# Patient Record
(demographics unavailable — no encounter records)

---

## 2024-10-07 NOTE — HISTORY OF PRESENT ILLNESS
[FreeTextEntry1] : F/U Apt. for Renewal of Meds and TDAP  [de-identified] : F/U Apt. for Renewal of Meds and TDAP

## 2024-10-07 NOTE — REVIEW OF SYSTEMS
[Patient Intake Form Reviewed] : Patient intake form was reviewed [Fatigue] : fatigue [Dyspnea on Exertion] : dyspnea on exertion [Abdominal Pain] : abdominal pain [Nausea] : nausea [Constipation] : constipation [Vomiting] : vomiting [Heartburn] : heartburn [Melena] : melsamir [Joint Pain] : joint pain [Anxiety] : anxiety [Negative] : Heme/Lymph [Fever] : no fever [Chills] : no chills [Chest Pain] : no chest pain [Palpitations] : no palpitations [Claudication] : no  leg claudication [Lower Ext Edema] : no lower extremity edema [Orthopena] : no orthopnea [Paroxysmal Nocturnal Dyspnea] : no paroxysmal nocturnal dyspnea [Shortness Of Breath] : no shortness of breath [Wheezing] : no wheezing [Cough] : no cough [Diarrhea] : no diarrhea [Dysuria] : no dysuria [Incontinence] : no incontinence [Hesitancy] : no hesitancy [Nocturia] : no nocturia [Hematuria] : no hematuria [Frequency] : no frequency [Impotence] : no impotency [Poor Libido] : libido not poor [Suicidal] : not suicidal [Insomnia] : no insomnia [Depression] : no depression [FreeTextEntry2] : Overweight [FreeTextEntry5] : CHF  [FreeTextEntry6] : obesity and CHF  [FreeTextEntry7] : GERD, stomach pain with eating  [FreeTextEntry8] : BPH, Urine very dark, sometimes light brown.  [FreeTextEntry9] : chronic knee pain  [FreeTextEntry1] : Low Vit. D, DM Borderline

## 2024-10-07 NOTE — HEALTH RISK ASSESSMENT
[1 or 2 (0 pts)] : 1 or 2 (0 points) [Never (0 pts)] : Never (0 points) [No] : In the past 12 months have you used drugs other than those required for medical reasons? No [No falls in past year] : Patient reported no falls in the past year [0] : 2) Feeling down, depressed, or hopeless: Not at all (0) [PHQ-2 Negative - No further assessment needed] : PHQ-2 Negative - No further assessment needed [Patient/Caregiver not ready to engage] : , patient/caregiver not ready to engage [I will adhere to the patient's wishes.] : I will adhere to the patient's wishes. [Time Spent: ___ minutes] : Time Spent: [unfilled] minutes [Never] : Never [Audit-CScore] : 0 [de-identified] : Average [de-identified] : Average [GVT3Lmpnb] : 0 [AdvancecareDate] : 03/24

## 2024-10-07 NOTE — PHYSICAL EXAM
[No Acute Distress] : no acute distress [Well Nourished] : well nourished [Well Developed] : well developed [Well-Appearing] : well-appearing [Normal Sclera/Conjunctiva] : normal sclera/conjunctiva [PERRL] : pupils equal round and reactive to light [EOMI] : extraocular movements intact [Normal Outer Ear/Nose] : the outer ears and nose were normal in appearance [Normal Oropharynx] : the oropharynx was normal [No JVD] : no jugular venous distention [No Lymphadenopathy] : no lymphadenopathy [Supple] : supple [Thyroid Normal, No Nodules] : the thyroid was normal and there were no nodules present [No Respiratory Distress] : no respiratory distress  [No Accessory Muscle Use] : no accessory muscle use [Clear to Auscultation] : lungs were clear to auscultation bilaterally [Normal Rate] : normal rate  [Regular Rhythm] : with a regular rhythm [Normal S1, S2] : normal S1 and S2 [No Murmur] : no murmur heard [No Carotid Bruits] : no carotid bruits [No Abdominal Bruit] : a ~M bruit was not heard ~T in the abdomen [No Varicosities] : no varicosities [Pedal Pulses Present] : the pedal pulses are present [No Edema] : there was no peripheral edema [No Palpable Aorta] : no palpable aorta [No Extremity Clubbing/Cyanosis] : no extremity clubbing/cyanosis [Soft] : abdomen soft [Non Tender] : non-tender [Non-distended] : non-distended [No Masses] : no abdominal mass palpated [No HSM] : no HSM [Normal Bowel Sounds] : normal bowel sounds [Normal] : soft, non-tender, non-distended, no masses palpated, no HSM and normal bowel sounds [Normal Posterior Cervical Nodes] : no posterior cervical lymphadenopathy [Normal Anterior Cervical Nodes] : no anterior cervical lymphadenopathy [No CVA Tenderness] : no CVA  tenderness [No Spinal Tenderness] : no spinal tenderness [No Joint Swelling] : no joint swelling [Grossly Normal Strength/Tone] : grossly normal strength/tone [No Rash] : no rash [Coordination Grossly Intact] : coordination grossly intact [No Focal Deficits] : no focal deficits [Normal Gait] : normal gait [Deep Tendon Reflexes (DTR)] : deep tendon reflexes were 2+ and symmetric [Normal Affect] : the affect was normal [Normal Mood] : the mood was normal [Normal Insight/Judgement] : insight and judgment were intact [de-identified] : pacemaker

## 2024-10-07 NOTE — COUNSELING
[Fall prevention counseling provided] : Fall prevention counseling provided [Adequate lighting] : Adequate lighting [No throw rugs] : No throw rugs [Use proper foot wear] : Use proper foot wear [Behavioral health counseling provided] : Behavioral health counseling provided [Sleep ___ hours/day] : Sleep [unfilled] hours/day [Engage in a relaxing activity] : Engage in a relaxing activity [Plan in advance] : Plan in advance [AUDIT-C Screening administered and reviewed] : AUDIT-C Screening administered and reviewed [Potential consequences of obesity discussed] : Potential consequences of obesity discussed [Benefits of weight loss discussed] : Benefits of weight loss discussed [Encouraged to increase physical activity] : Encouraged to increase physical activity [Weigh Self Weekly] : weigh self weekly [Decrease Portions] : decrease portions [None] : None [Good understanding] : Patient has a good understanding of lifestyle changes and steps needed to achieve self management goal [FreeTextEntry2] : Quit

## 2024-10-07 NOTE — ASSESSMENT
[FreeTextEntry1] : HTN/Overweight/Fatigue/CAD -Diet exercise, controlled with meds.  PSA Screening - PSA. 2.19 Anxiety - managed with medication ED - Controlled with meds  COVID  19 Screening - + COVID 19 Abs.  Headaches - Neurology Mgt R Hand Pain - Hand Surgical Mgt. Knee Pain - Ortho Mgt. Vit D deficiency - Vit D  CKD/decreased GFR/Microalbuminuria - CONSIDER D/C SPIRINOLACTONE, ADD KERINDIA  TDAP given R arm.  F/U in the spring for lab testing.

## 2024-10-16 NOTE — ASSESSMENT
[FreeTextEntry1] : 59 y/o M with hx of CAD, CHF, hx of Afib s/p ablation x 3, Arthritis, Anxiety, PTSD, DM, GERD, Pacemaker, kidney stones, sleep apnea, who presents for evaluation of sciatica pain.  History suggestive of possible R L5 radiculopathy, no recent imaging.  Patient has tried multiple conservate approaches without relief.  Would reevaluate lumbar spine to see if an epidural injection might benefit.   Also has daily frontal/bitemporal headaches--will confirm no intracranial pathology  PLAN: - MRI Brain wo  - MRA head to r/o aneurysm given daily headaches - MRI L spine - Patient will need device information regarding pacemaker in order to complete MRI. He has had an MRI in the past. - Referral to Pain management - Increase Gabapentin to 300mg q 6h

## 2024-10-16 NOTE — HISTORY OF PRESENT ILLNESS
[FreeTextEntry1] :  [facility] NEUROLOGY AT [place]  CC: Sciatica HPI: 57 y/o M with hx of CAD, CHF, hx of Afib s/p ablation x 3,  Arthritis, Anxiety, PTSD, DM, GERD, Pacemaker, kidney stones, sleep apnea, who presents for evaluation of sciatica pain.    For past one year and half, has been having right buttock pain that radiates to the back of the leg down to the foot.  Currently on Gabapentin 300mg TID which gives partial relief but then stops.  Was previously 318lbs, now at 270lb but cannot walk due to knee issues. Says he is due to having left knee pain and will require a replacement. States he previously had an MRI lumbar spine and some disc disease in the low back.  Has tried trigger points in the buttocks and mid thigh, but no relief.  Has tried PT and stretching, seen a chiropractor at work.  Patient denies any weakness but noticing tingling in the right toes.    Also has complaints of daily headaches, located in bitemporal/frontal region. Has chronic neck tension since playing football when younger. Headaches are tightness 7/10, no other associated symptoms.

## 2024-10-16 NOTE — PHYSICAL EXAM
[FreeTextEntry1] :   General: Cooperative, NAD HEENT: NC/AT, no carotid bruits Lungs: CTAB Chest: RRR, no murmurs Extremities: nontender, no erythema Neurological Examination: MS: AOx3. Appropriately interactive, normal affect. Speech fluent w/o paraphasic errors CN: PERLL, EOMI, V1-3 sensation intact, face symmetric, hearing intact, palate elevates symmetrically, tongue midline, SCM equal bilaterally Motor: normal bulk and tone, no tremor, rigidity or bradykinesia.  5/5 all over Sens: Intact to light touch. Reflexes: 2/4 all over, downgoing toes b/l Coord:  No dysmetria, MARLI intact Gait: antalgic

## 2024-10-24 NOTE — HISTORY OF PRESENT ILLNESS
[FreeTextEntry1] : F/U Apt. for Renewal of Meds ENT referral  [de-identified] : F/U Apt. for Renewal of Meds and ENT referral

## 2024-10-24 NOTE — HEALTH RISK ASSESSMENT
[Audit-CScore] : 0 [de-identified] : Average [de-identified] : Average [NNQ3Gjjkz] : 0 [AdvancecareDate] : 03/24

## 2024-10-24 NOTE — REVIEW OF SYSTEMS
[Fever] : no fever [Chills] : no chills [Chest Pain] : no chest pain [Palpitations] : no palpitations [Claudication] : no  leg claudication [Lower Ext Edema] : no lower extremity edema [Orthopena] : no orthopnea [Paroxysmal Nocturnal Dyspnea] : no paroxysmal nocturnal dyspnea [Shortness Of Breath] : no shortness of breath [Wheezing] : no wheezing [Cough] : no cough [Diarrhea] : no diarrhea [Dysuria] : no dysuria [Incontinence] : no incontinence [Hesitancy] : no hesitancy [Nocturia] : no nocturia [Hematuria] : no hematuria [Frequency] : no frequency [Impotence] : no impotency [Poor Libido] : libido not poor [Suicidal] : not suicidal [Insomnia] : no insomnia [Depression] : no depression [FreeTextEntry2] : Overweight [FreeTextEntry5] : CHF  [FreeTextEntry6] : obesity and CHF  [FreeTextEntry7] : GERD, stomach pain with eating  [FreeTextEntry8] : BPH, Urine very dark, sometimes light brown.  [FreeTextEntry9] : chronic knee pain  [FreeTextEntry1] : Low Vit. D, DM Borderline

## 2024-10-30 NOTE — ASSESSMENT
[FreeTextEntry1] : Hearing loss, likely some noise related loss.  Check Audiogram.  Has and is using HA's from  mother.  Will return to audiologist for possible reprogramming. Flonase for rhinitis. Seek attention for otalgia, otorrhea, bleeding, headache, hearing loss, dizziness or vertigo. Followup 2 months

## 2024-10-30 NOTE — HISTORY OF PRESENT ILLNESS
[de-identified] : 58 year old male here for tinnitus for 1 year and decreased hearing for 3 weeks with intermittent dizziness. Patient denies otalgia, otorrhea, ear infections, vertigo. Pt has been wearing  mothers hearing aids to hear. Most recent hearing test 2 years ago showed mild hearing loss.   Also notes nonpurulent runnyt nose moreso recently.  PMH occupational and environmental noise exposure

## 2024-10-30 NOTE — PHYSICAL EXAM
[de-identified] : mild nasal congestion of mucosa [Midline] : trachea located in midline position [Normal] : salivary glands are normal

## 2024-10-30 NOTE — CONSULT LETTER
[Dear  ___] : Dear  [unfilled], [Courtesy Letter:] : I had the pleasure of seeing your patient, [unfilled], in my office today. [Please see my note below.] : Please see my note below. [Sincerely,] : Sincerely, [FreeTextEntry2] : Dr. Amado White [FreeTextEntry3] : Irineo Peters MD Otolaryngology at [address], [address] [place] [address] Phone: [phone] Fax: [phone]

## 2024-11-20 NOTE — PHYSICAL EXAM
[No Acute Distress] : no acute distress [Well Nourished] : well nourished [Well Developed] : well developed [Well-Appearing] : well-appearing [Normal Sclera/Conjunctiva] : normal sclera/conjunctiva [PERRL] : pupils equal round and reactive to light [EOMI] : extraocular movements intact [Normal Outer Ear/Nose] : the outer ears and nose were normal in appearance [Normal Oropharynx] : the oropharynx was normal [No JVD] : no jugular venous distention [No Lymphadenopathy] : no lymphadenopathy [Supple] : supple [Thyroid Normal, No Nodules] : the thyroid was normal and there were no nodules present [No Respiratory Distress] : no respiratory distress  [No Accessory Muscle Use] : no accessory muscle use [Clear to Auscultation] : lungs were clear to auscultation bilaterally [Normal Rate] : normal rate  [Regular Rhythm] : with a regular rhythm [Normal S1, S2] : normal S1 and S2 [No Murmur] : no murmur heard [No Carotid Bruits] : no carotid bruits [No Abdominal Bruit] : a ~M bruit was not heard ~T in the abdomen [No Varicosities] : no varicosities [Pedal Pulses Present] : the pedal pulses are present [No Edema] : there was no peripheral edema [No Palpable Aorta] : no palpable aorta [No Extremity Clubbing/Cyanosis] : no extremity clubbing/cyanosis [Soft] : abdomen soft [Non Tender] : non-tender [Non-distended] : non-distended [No Masses] : no abdominal mass palpated [No HSM] : no HSM [Normal Bowel Sounds] : normal bowel sounds [Normal] : soft, non-tender, non-distended, no masses palpated, no HSM and normal bowel sounds [Normal Posterior Cervical Nodes] : no posterior cervical lymphadenopathy [Normal Anterior Cervical Nodes] : no anterior cervical lymphadenopathy [No CVA Tenderness] : no CVA  tenderness [No Spinal Tenderness] : no spinal tenderness [No Joint Swelling] : no joint swelling [Grossly Normal Strength/Tone] : grossly normal strength/tone [No Rash] : no rash [Coordination Grossly Intact] : coordination grossly intact [No Focal Deficits] : no focal deficits [Normal Gait] : normal gait [Deep Tendon Reflexes (DTR)] : deep tendon reflexes were 2+ and symmetric [Normal Affect] : the affect was normal [Normal Mood] : the mood was normal [Normal Insight/Judgement] : insight and judgment were intact [de-identified] : pacemaker

## 2024-11-20 NOTE — HEALTH RISK ASSESSMENT
[1 or 2 (0 pts)] : 1 or 2 (0 points) [Never (0 pts)] : Never (0 points) [No] : In the past 12 months have you used drugs other than those required for medical reasons? No [No falls in past year] : Patient reported no falls in the past year [0] : 2) Feeling down, depressed, or hopeless: Not at all (0) [PHQ-2 Negative - No further assessment needed] : PHQ-2 Negative - No further assessment needed [Audit-CScore] : 0 [de-identified] : Average [de-identified] : Average [[id number]] : 0 [Patient/Caregiver not ready to engage] : , patient/caregiver not ready to engage [I will adhere to the patient's wishes.] : I will adhere to the patient's wishes. [Time Spent: ___ minutes] : Time Spent: [unfilled] minutes [AdvancecareDate] : 03/24 [Never] : Never

## 2024-11-20 NOTE — ASSESSMENT
[FreeTextEntry1] : HTN/Overweight/Fatigue/CAD -Diet exercise, controlled with meds.  PSA Screening - PSA. 2.19 Anxiety - managed with medication ED - Controlled with meds  COVID  19 Screening - + COVID 19 Abs.  Headaches - Neurology Mgt R Hand Pain - Hand Surgical Mgt. Knee Pain - Ortho Mgt. Vit D deficiency - Vit D  CKD/decreased GFR/Microalbuminuria - CONSIDER D/C SPIRINOLACTONE, ADD KERINDIA   F/U in the spring for lab testing.

## 2024-11-20 NOTE — COUNSELING
[Fall prevention counseling provided] : Fall prevention counseling provided [Adequate lighting] : Adequate lighting [No throw rugs] : No throw rugs [Use proper foot wear] : Use proper foot wear [Behavioral health counseling provided] : Behavioral health counseling provided [Sleep ___ hours/day] : Sleep [unfilled] hours/day [Engage in a relaxing activity] : Engage in a relaxing activity [Plan in advance] : Plan in advance [FreeTextEntry2] : Quit [AUDIT-C Screening administered and reviewed] : AUDIT-C Screening administered and reviewed [Potential consequences of obesity discussed] : Potential consequences of obesity discussed [Benefits of weight loss discussed] : Benefits of weight loss discussed [Encouraged to increase physical activity] : Encouraged to increase physical activity [Weigh Self Weekly] : weigh self weekly [Decrease Portions] : decrease portions [None] : None [Good understanding] : Patient has a good understanding of lifestyle changes and steps needed to achieve self management goal

## 2024-11-20 NOTE — HEALTH RISK ASSESSMENT
[1 or 2 (0 pts)] : 1 or 2 (0 points) [Never (0 pts)] : Never (0 points) [No] : In the past 12 months have you used drugs other than those required for medical reasons? No [No falls in past year] : Patient reported no falls in the past year [0] : 2) Feeling down, depressed, or hopeless: Not at all (0) [PHQ-2 Negative - No further assessment needed] : PHQ-2 Negative - No further assessment needed [Audit-CScore] : 0 [de-identified] : Average [de-identified] : Average [[id number]] : 0 [Patient/Caregiver not ready to engage] : , patient/caregiver not ready to engage [I will adhere to the patient's wishes.] : I will adhere to the patient's wishes. [Time Spent: ___ minutes] : Time Spent: [unfilled] minutes [AdvancecareDate] : 03/24 [Never] : Never

## 2024-11-20 NOTE — PHYSICAL EXAM
[No Acute Distress] : no acute distress [Well Nourished] : well nourished [Well Developed] : well developed [Well-Appearing] : well-appearing [Normal Sclera/Conjunctiva] : normal sclera/conjunctiva [PERRL] : pupils equal round and reactive to light [EOMI] : extraocular movements intact [Normal Outer Ear/Nose] : the outer ears and nose were normal in appearance [Normal Oropharynx] : the oropharynx was normal [No JVD] : no jugular venous distention [No Lymphadenopathy] : no lymphadenopathy [Supple] : supple [Thyroid Normal, No Nodules] : the thyroid was normal and there were no nodules present [No Respiratory Distress] : no respiratory distress  [No Accessory Muscle Use] : no accessory muscle use [Clear to Auscultation] : lungs were clear to auscultation bilaterally [Normal Rate] : normal rate  [Regular Rhythm] : with a regular rhythm [Normal S1, S2] : normal S1 and S2 [No Murmur] : no murmur heard [No Carotid Bruits] : no carotid bruits [No Abdominal Bruit] : a ~M bruit was not heard ~T in the abdomen [No Varicosities] : no varicosities [Pedal Pulses Present] : the pedal pulses are present [No Edema] : there was no peripheral edema [No Palpable Aorta] : no palpable aorta [No Extremity Clubbing/Cyanosis] : no extremity clubbing/cyanosis [Soft] : abdomen soft [Non Tender] : non-tender [Non-distended] : non-distended [No Masses] : no abdominal mass palpated [No HSM] : no HSM [Normal Bowel Sounds] : normal bowel sounds [Normal] : soft, non-tender, non-distended, no masses palpated, no HSM and normal bowel sounds [Normal Posterior Cervical Nodes] : no posterior cervical lymphadenopathy [Normal Anterior Cervical Nodes] : no anterior cervical lymphadenopathy [No CVA Tenderness] : no CVA  tenderness [No Spinal Tenderness] : no spinal tenderness [No Joint Swelling] : no joint swelling [Grossly Normal Strength/Tone] : grossly normal strength/tone [No Rash] : no rash [Coordination Grossly Intact] : coordination grossly intact [No Focal Deficits] : no focal deficits [Normal Gait] : normal gait [Deep Tendon Reflexes (DTR)] : deep tendon reflexes were 2+ and symmetric [Normal Affect] : the affect was normal [Normal Mood] : the mood was normal [Normal Insight/Judgement] : insight and judgment were intact [de-identified] : pacemaker

## 2024-11-20 NOTE — REVIEW OF SYSTEMS
[Patient Intake Form Reviewed] : Patient intake form was reviewed [Fever] : no fever [Chills] : no chills [Fatigue] : fatigue [Chest Pain] : no chest pain [Palpitations] : no palpitations [Claudication] : no  leg claudication [Lower Ext Edema] : no lower extremity edema [Orthopena] : no orthopnea [Paroxysmal Nocturnal Dyspnea] : no paroxysmal nocturnal dyspnea [Shortness Of Breath] : no shortness of breath [Wheezing] : no wheezing [Cough] : no cough [Dyspnea on Exertion] : dyspnea on exertion [Abdominal Pain] : abdominal pain [Nausea] : nausea [Constipation] : constipation [Diarrhea] : no diarrhea [Vomiting] : vomiting [Heartburn] : heartburn [Melena] : melsamir [Dysuria] : no dysuria [Incontinence] : no incontinence [Hesitancy] : no hesitancy [Nocturia] : no nocturia [Hematuria] : no hematuria [Frequency] : no frequency [Impotence] : no impotency [Poor Libido] : libido not poor [Joint Pain] : joint pain [Suicidal] : not suicidal [Insomnia] : no insomnia [Anxiety] : anxiety [Depression] : no depression [Negative] : Heme/Lymph [FreeTextEntry2] : Overweight [FreeTextEntry5] : CHF  [FreeTextEntry6] : obesity and CHF  [FreeTextEntry7] : GERD, stomach pain with eating  [FreeTextEntry8] : BPH, Urine very dark, sometimes light brown.  [FreeTextEntry9] : chronic knee pain  [FreeTextEntry1] : Low Vit. D, DM Borderline

## 2024-12-10 NOTE — HEALTH RISK ASSESSMENT
[1 or 2 (0 pts)] : 1 or 2 (0 points) [Never (0 pts)] : Never (0 points) [No] : In the past 12 months have you used drugs other than those required for medical reasons? No [No falls in past year] : Patient reported no falls in the past year [0] : 2) Feeling down, depressed, or hopeless: Not at all (0) [PHQ-2 Negative - No further assessment needed] : PHQ-2 Negative - No further assessment needed [Patient/Caregiver not ready to engage] : , patient/caregiver not ready to engage [I will adhere to the patient's wishes.] : I will adhere to the patient's wishes. [Time Spent: ___ minutes] : Time Spent: [unfilled] minutes [Never] : Never [Audit-CScore] : 0 [de-identified] : Average [de-identified] : Average [TLD2Lzxzg] : 0 [AdvancecareDate] : 03/24

## 2024-12-10 NOTE — PHYSICAL EXAM
[No Acute Distress] : no acute distress [Well Nourished] : well nourished [Well Developed] : well developed [Well-Appearing] : well-appearing [Normal Sclera/Conjunctiva] : normal sclera/conjunctiva [PERRL] : pupils equal round and reactive to light [EOMI] : extraocular movements intact [Normal Outer Ear/Nose] : the outer ears and nose were normal in appearance [Normal Oropharynx] : the oropharynx was normal [No JVD] : no jugular venous distention [No Lymphadenopathy] : no lymphadenopathy [Supple] : supple [Thyroid Normal, No Nodules] : the thyroid was normal and there were no nodules present [No Respiratory Distress] : no respiratory distress  [No Accessory Muscle Use] : no accessory muscle use [Clear to Auscultation] : lungs were clear to auscultation bilaterally [Normal Rate] : normal rate  [Regular Rhythm] : with a regular rhythm [Normal S1, S2] : normal S1 and S2 [No Murmur] : no murmur heard [No Carotid Bruits] : no carotid bruits [No Abdominal Bruit] : a ~M bruit was not heard ~T in the abdomen [No Varicosities] : no varicosities [Pedal Pulses Present] : the pedal pulses are present [No Edema] : there was no peripheral edema [No Palpable Aorta] : no palpable aorta [No Extremity Clubbing/Cyanosis] : no extremity clubbing/cyanosis [Soft] : abdomen soft [Non Tender] : non-tender [Non-distended] : non-distended [No Masses] : no abdominal mass palpated [No HSM] : no HSM [Normal Bowel Sounds] : normal bowel sounds [Normal] : soft, non-tender, non-distended, no masses palpated, no HSM and normal bowel sounds [Normal Posterior Cervical Nodes] : no posterior cervical lymphadenopathy [Normal Anterior Cervical Nodes] : no anterior cervical lymphadenopathy [No CVA Tenderness] : no CVA  tenderness [No Spinal Tenderness] : no spinal tenderness [No Joint Swelling] : no joint swelling [Grossly Normal Strength/Tone] : grossly normal strength/tone [No Rash] : no rash [Coordination Grossly Intact] : coordination grossly intact [No Focal Deficits] : no focal deficits [Normal Gait] : normal gait [Deep Tendon Reflexes (DTR)] : deep tendon reflexes were 2+ and symmetric [Normal Affect] : the affect was normal [Normal Mood] : the mood was normal [Normal Insight/Judgement] : insight and judgment were intact [de-identified] : pacemaker

## 2024-12-10 NOTE — ASSESSMENT
[FreeTextEntry1] : HTN/Overweight/Fatigue/CAD -Diet exercise, controlled with meds.  PSA Screening - PSA. 2.70. Anxiety - managed with medication ED - Controlled with meds  COVID  19 Screening - + COVID 19 Abs.  Headaches - Neurology Mgt R Hand Pain - Hand Surgical Mgt. Knee Pain - Ortho Mgt. Vit D deficiency - Vit D  CKD/decreased GFR/Microalbuminuria - CONSIDER D/C SPIRINOLACTONE, ADD KERINDIA after labs.   F/U in 2-3 week for review of labs

## 2024-12-27 NOTE — HISTORY OF PRESENT ILLNESS
[FreeTextEntry1] :  [facility] NEUROLOGY AT [place]  CC: Sciatica HPI: 59 y/o M with hx of CAD, CHF, hx of Afib s/p ablation x 3,  Arthritis, Anxiety, PTSD, DM, GERD, Pacemaker, kidney stones, sleep apnea, who presents for follow-up of sciatica pain.    10/16/24: For past one year and half, has been having right buttock pain that radiates to the back of the leg down to the foot.  Currently on Gabapentin 300mg TID which gives partial relief but then stops.  Was previously 318lbs, now at 270lb but cannot walk due to knee issues. Says is due to having left knee pain and will require a replacement. States he previously had an MRI lumbar spine and some disc disease in the low back.  Has tried trigger points in the buttocks and mid thigh, but no relief.  Has tried PT and stretching, seen a chiropractor at work.  Patient denies any weakness but noticing tingling in the right toes.    Also has complaints of daily headaches, located in bitemporal/frontal region. Has chronic neck tension since playing football when younger. Headaches are tightness 7/10, no other associated symptoms.    Today 12/27/24: Referred for MRIs at last visit but not completed yet due to pacemaker. Also referred to pain management.

## 2024-12-30 NOTE — HEALTH RISK ASSESSMENT
[1 or 2 (0 pts)] : 1 or 2 (0 points) [Never (0 pts)] : Never (0 points) [No] : In the past 12 months have you used drugs other than those required for medical reasons? No [No falls in past year] : Patient reported no falls in the past year [0] : 2) Feeling down, depressed, or hopeless: Not at all (0) [PHQ-2 Negative - No further assessment needed] : PHQ-2 Negative - No further assessment needed [Patient/Caregiver not ready to engage] : , patient/caregiver not ready to engage [I will adhere to the patient's wishes.] : I will adhere to the patient's wishes. [Time Spent: ___ minutes] : Time Spent: [unfilled] minutes [Never] : Never [Audit-CScore] : 0 [de-identified] : Average [de-identified] : Average [[id number]] : 0 [AdvancecareDate] : 03/24

## 2024-12-30 NOTE — ASSESSMENT
[FreeTextEntry1] : HTN/Overweight/Fatigue/CAD -Diet Exercise, Controlled with Meds.  PSA Screening - PSA. 2.19 Anxiety - managed with medication ED - Controlled with meds  COVID  19 Screening - + COVID 19 Abs.  Headaches - Neurology Mgt R Hand Pain - Hand Surgical Mgt. Knee Pain - Ortho Mgt. Vit D deficiency - Vit D  CKD/decreased GFR/Microalbuminuria - CONSIDER D/C SPIRINOLACTONE, ADD KERINDIA after labs.   F/U in Spring to Repeat Test Results.

## 2024-12-30 NOTE — PHYSICAL EXAM
[No Acute Distress] : no acute distress [Well Nourished] : well nourished [Well Developed] : well developed [Well-Appearing] : well-appearing [Normal Sclera/Conjunctiva] : normal sclera/conjunctiva [PERRL] : pupils equal round and reactive to light [EOMI] : extraocular movements intact [Normal Outer Ear/Nose] : the outer ears and nose were normal in appearance [Normal Oropharynx] : the oropharynx was normal [No JVD] : no jugular venous distention [No Lymphadenopathy] : no lymphadenopathy [Supple] : supple [Thyroid Normal, No Nodules] : the thyroid was normal and there were no nodules present [No Respiratory Distress] : no respiratory distress  [No Accessory Muscle Use] : no accessory muscle use [Clear to Auscultation] : lungs were clear to auscultation bilaterally [Normal Rate] : normal rate  [Regular Rhythm] : with a regular rhythm [Normal S1, S2] : normal S1 and S2 [No Murmur] : no murmur heard [No Carotid Bruits] : no carotid bruits [No Abdominal Bruit] : a ~M bruit was not heard ~T in the abdomen [No Varicosities] : no varicosities [Pedal Pulses Present] : the pedal pulses are present [No Edema] : there was no peripheral edema [No Palpable Aorta] : no palpable aorta [No Extremity Clubbing/Cyanosis] : no extremity clubbing/cyanosis [Soft] : abdomen soft [Non Tender] : non-tender [Non-distended] : non-distended [No Masses] : no abdominal mass palpated [No HSM] : no HSM [Normal Bowel Sounds] : normal bowel sounds [Normal] : soft, non-tender, non-distended, no masses palpated, no HSM and normal bowel sounds [Normal Posterior Cervical Nodes] : no posterior cervical lymphadenopathy [Normal Anterior Cervical Nodes] : no anterior cervical lymphadenopathy [No CVA Tenderness] : no CVA  tenderness [No Spinal Tenderness] : no spinal tenderness [No Joint Swelling] : no joint swelling [Grossly Normal Strength/Tone] : grossly normal strength/tone [No Rash] : no rash [Coordination Grossly Intact] : coordination grossly intact [No Focal Deficits] : no focal deficits [Normal Gait] : normal gait [Deep Tendon Reflexes (DTR)] : deep tendon reflexes were 2+ and symmetric [Normal Affect] : the affect was normal [Normal Mood] : the mood was normal [Normal Insight/Judgement] : insight and judgment were intact [de-identified] : pacemaker

## 2025-03-24 NOTE — PHYSICAL EXAM
[No Acute Distress] : no acute distress [Well Nourished] : well nourished [Well Developed] : well developed [Well-Appearing] : well-appearing [Normal Sclera/Conjunctiva] : normal sclera/conjunctiva [PERRL] : pupils equal round and reactive to light [EOMI] : extraocular movements intact [Normal Outer Ear/Nose] : the outer ears and nose were normal in appearance [Normal Oropharynx] : the oropharynx was normal [No JVD] : no jugular venous distention [No Lymphadenopathy] : no lymphadenopathy [Supple] : supple [Thyroid Normal, No Nodules] : the thyroid was normal and there were no nodules present [No Respiratory Distress] : no respiratory distress  [No Accessory Muscle Use] : no accessory muscle use [Clear to Auscultation] : lungs were clear to auscultation bilaterally [Normal Rate] : normal rate  [Regular Rhythm] : with a regular rhythm [Normal S1, S2] : normal S1 and S2 [No Murmur] : no murmur heard [No Carotid Bruits] : no carotid bruits [No Abdominal Bruit] : a ~M bruit was not heard ~T in the abdomen [No Varicosities] : no varicosities [Pedal Pulses Present] : the pedal pulses are present [No Edema] : there was no peripheral edema [No Palpable Aorta] : no palpable aorta [No Extremity Clubbing/Cyanosis] : no extremity clubbing/cyanosis [Soft] : abdomen soft [Non Tender] : non-tender [Non-distended] : non-distended [No Masses] : no abdominal mass palpated [No HSM] : no HSM [Normal Bowel Sounds] : normal bowel sounds [Normal] : soft, non-tender, non-distended, no masses palpated, no HSM and normal bowel sounds [Normal Posterior Cervical Nodes] : no posterior cervical lymphadenopathy [Normal Anterior Cervical Nodes] : no anterior cervical lymphadenopathy [No CVA Tenderness] : no CVA  tenderness [No Spinal Tenderness] : no spinal tenderness [No Joint Swelling] : no joint swelling [Grossly Normal Strength/Tone] : grossly normal strength/tone [No Rash] : no rash [Coordination Grossly Intact] : coordination grossly intact [No Focal Deficits] : no focal deficits [Normal Gait] : normal gait [Deep Tendon Reflexes (DTR)] : deep tendon reflexes were 2+ and symmetric [Normal Affect] : the affect was normal [Normal Mood] : the mood was normal [Normal Insight/Judgement] : insight and judgment were intact [de-identified] : pacemaker

## 2025-03-24 NOTE — HEALTH RISK ASSESSMENT
[1 or 2 (0 pts)] : 1 or 2 (0 points) [Never (0 pts)] : Never (0 points) [No] : In the past 12 months have you used drugs other than those required for medical reasons? No [No falls in past year] : Patient reported no falls in the past year [0] : 2) Feeling down, depressed, or hopeless: Not at all (0) [PHQ-2 Negative - No further assessment needed] : PHQ-2 Negative - No further assessment needed [Patient/Caregiver not ready to engage] : , patient/caregiver not ready to engage [I will adhere to the patient's wishes.] : I will adhere to the patient's wishes. [Time Spent: ___ minutes] : Time Spent: [unfilled] minutes [Never] : Never [Audit-CScore] : 0 [de-identified] : Average [de-identified] : Average [[id number]] : 0 [AdvancecareDate] : 03/24

## 2025-05-05 NOTE — HEALTH RISK ASSESSMENT
[Audit-CScore] : 0 [de-identified] : Average [de-identified] : Average [LUT0Maxha] : 0 [AdvancecareDate] : 03/24

## 2025-05-05 NOTE — HEALTH RISK ASSESSMENT
[Audit-CScore] : 0 [de-identified] : Average [de-identified] : Average [TPM7Afgwc] : 0 [AdvancecareDate] : 03/24

## 2025-05-05 NOTE — HISTORY OF PRESENT ILLNESS
[FreeTextEntry1] : F/U for med renewal and lab results [de-identified] : F/U for med renewal and lab results

## 2025-05-05 NOTE — HISTORY OF PRESENT ILLNESS
[FreeTextEntry1] : F/U for med renewal and lab results [de-identified] : F/U for med renewal and lab results

## 2025-06-03 NOTE — HEALTH RISK ASSESSMENT
[1 or 2 (0 pts)] : 1 or 2 (0 points) [Never (0 pts)] : Never (0 points) [No] : In the past 12 months have you used drugs other than those required for medical reasons? No [No falls in past year] : Patient reported no falls in the past year [0] : 2) Feeling down, depressed, or hopeless: Not at all (0) [PHQ-2 Negative - No further assessment needed] : PHQ-2 Negative - No further assessment needed [Patient/Caregiver not ready to engage] : , patient/caregiver not ready to engage [I will adhere to the patient's wishes.] : I will adhere to the patient's wishes. [Time Spent: ___ minutes] : Time Spent: [unfilled] minutes [Never] : Never [Audit-CScore] : 0 [de-identified] : Average [de-identified] : Average [[id number]] : 0 [AdvancecareDate] : 03/24

## 2025-06-03 NOTE — PHYSICAL EXAM
[No Acute Distress] : no acute distress [Well Nourished] : well nourished [Well Developed] : well developed [Well-Appearing] : well-appearing [Normal Sclera/Conjunctiva] : normal sclera/conjunctiva [PERRL] : pupils equal round and reactive to light [EOMI] : extraocular movements intact [Normal Outer Ear/Nose] : the outer ears and nose were normal in appearance [Normal Oropharynx] : the oropharynx was normal [No JVD] : no jugular venous distention [No Lymphadenopathy] : no lymphadenopathy [Supple] : supple [Thyroid Normal, No Nodules] : the thyroid was normal and there were no nodules present [No Respiratory Distress] : no respiratory distress  [No Accessory Muscle Use] : no accessory muscle use [Clear to Auscultation] : lungs were clear to auscultation bilaterally [Normal Rate] : normal rate  [Regular Rhythm] : with a regular rhythm [Normal S1, S2] : normal S1 and S2 [No Murmur] : no murmur heard [No Carotid Bruits] : no carotid bruits [No Abdominal Bruit] : a ~M bruit was not heard ~T in the abdomen [No Varicosities] : no varicosities [Pedal Pulses Present] : the pedal pulses are present [No Edema] : there was no peripheral edema [No Palpable Aorta] : no palpable aorta [No Extremity Clubbing/Cyanosis] : no extremity clubbing/cyanosis [Soft] : abdomen soft [Non Tender] : non-tender [Non-distended] : non-distended [No Masses] : no abdominal mass palpated [No HSM] : no HSM [Normal Bowel Sounds] : normal bowel sounds [Normal] : soft, non-tender, non-distended, no masses palpated, no HSM and normal bowel sounds [Normal Posterior Cervical Nodes] : no posterior cervical lymphadenopathy [Normal Anterior Cervical Nodes] : no anterior cervical lymphadenopathy [No CVA Tenderness] : no CVA  tenderness [No Spinal Tenderness] : no spinal tenderness [No Joint Swelling] : no joint swelling [Grossly Normal Strength/Tone] : grossly normal strength/tone [No Rash] : no rash [Coordination Grossly Intact] : coordination grossly intact [No Focal Deficits] : no focal deficits [Normal Gait] : normal gait [Deep Tendon Reflexes (DTR)] : deep tendon reflexes were 2+ and symmetric [Normal Affect] : the affect was normal [Normal Mood] : the mood was normal [Normal Insight/Judgement] : insight and judgment were intact [de-identified] : pacemaker

## 2025-06-03 NOTE — REVIEW OF SYSTEMS
[Patient Intake Form Reviewed] : Patient intake form was reviewed [Fatigue] : fatigue [Dyspnea on Exertion] : dyspnea on exertion [Abdominal Pain] : abdominal pain [Nausea] : nausea [Constipation] : constipation [Vomiting] : vomiting [Heartburn] : heartburn [Melena] : melsamir [Joint Pain] : joint pain [Anxiety] : anxiety [Negative] : Heme/Lymph [Fever] : no fever [Chills] : no chills [Chest Pain] : no chest pain [Palpitations] : no palpitations [Claudication] : no  leg claudication [Lower Ext Edema] : no lower extremity edema [Orthopena] : no orthopnea [Paroxysmal Nocturnal Dyspnea] : no paroxysmal nocturnal dyspnea [Shortness Of Breath] : no shortness of breath [Wheezing] : no wheezing [Cough] : no cough [Diarrhea] : no diarrhea [Dysuria] : no dysuria [Incontinence] : no incontinence [Hesitancy] : no hesitancy [Nocturia] : no nocturia [Hematuria] : no hematuria [Frequency] : no frequency [Impotence] : no impotency [Poor Libido] : libido not poor [Suicidal] : not suicidal [Insomnia] : no insomnia [Depression] : no depression [FreeTextEntry2] : Overweight [FreeTextEntry5] : CHF  [FreeTextEntry7] : GERD, stomach pain with eating  [FreeTextEntry6] : obesity and CHF  [FreeTextEntry8] : BPH, Urine very dark, sometimes light brown.  [FreeTextEntry9] : chronic knee pain  [FreeTextEntry1] : Low Vit. D, DM Borderline

## 2025-06-03 NOTE — ASSESSMENT
[FreeTextEntry1] : HTN/Overweight/Fatigue/CAD - Diet Exercise, Controlled with Meds.  PSA Screening - PSA. 2.19 Anxiety - managed with medication ED - Controlled with meds  COVID  19 Screening - + COVID 19 Abs.  Headaches - Neurology Mgt R Hand Pain - Hand Surgical Mgt. Knee Pain - Ortho Mgt. Vit D deficiency - Vit D  CKD/decreased GFR/Microalbuminuria - CONSIDER D/C SPIRINOLACTONE, ADD KERINDIA after labs.  BL Knee Pain - Ortho Referral.  F/U in Spring to Repeat Test Results.

## 2025-06-19 NOTE — PHYSICAL EXAM
[de-identified] : GENERAL APPEARANCE: Well nourished and hydrated, pleasant, alert, and oriented x 3. Appears their stated age.  HEENT: Normocephalic, extraocular eye motion intact. Nasal septum midline. Oral cavity clear. External auditory canal clear.  RESPIRATORY: Breath sounds clear and audible in all lobes. No wheezing, No accessory muscle use. CARDIOVASCULAR: No apparent abnormalities. No lower leg edema. No varicosities. Pedal pulses are palpable. NEUROLOGIC: Sensation is normal, no muscle weakness in the upper or lower extremities. DERMATOLOGIC: No apparent skin lesions, moist, warm, no rash. SPINE: Cervical spine appears normal and moves freely; thoracic spine appears normal and moves freely; lumbosacral spine appears normal and moves freely, normal, nontender. MUSCULOSKELETAL: Hands, wrists, and elbows are normal and move freely, shoulders are normal and move freely.  Psychiatric: Oriented to person, place, and time, insight and judgement were intact and the affect was normal.  Musculoskeletal:. Left knee exam shows mild effusion, ROM is 10-1 20 degrees, no instability, no pain with Conrad, medial joint line tenderness.  5/5 motor strength in bilateral lower extremities. Sensory: Intact in bilateral lower extremities. DTRs: Biceps, brachioradialis, triceps, patellar, ankle and plantar 2+ and symmetric bilaterally. Pulses: dorsalis pedis, posterior tibial, femoral, popliteal, and radial 2+ and symmetric bilaterally.  Constitutional: Alert and in no acute distress, but well-appearing.   Right lower extremity: Incisions well-healed without erythema drainage or discharge, range of motion 0-1 20, there is instability to varus valgus stress with approximately 4 to 5 mm of gapping in extension as well as a 5 to 8 mm anterior drawer and flexion instability, tenderness palpation over the pes, 5 out of 5 strength with plantarflexion dorsiflexion, sensation intact to light touch over the foot, foot well-perfused brisk cap refill [de-identified] : 3 views of the right knee obtained the office today show no acute fracture or dislocation.  There is a right total knee arthroplasty in appropriate aligned without evidence of fracture dislocation or hardware complication.  4 views of the left knee obtained the office today show no acute fracture or dislocation.  There is severe medial joint space narrowing bone osteoarthritis tricompartmental degenerative changes consistent Kellgren-Maxim grade 4 changes

## 2025-06-19 NOTE — HISTORY OF PRESENT ILLNESS
[FreeTextEntry1] : KARLA FUENTES is a very pleasant 59 year old male with extensive medical history including CHF, CAD w/ stents (on Plavix), AICD, HTN, HLD, DM, BPH, KARINE, anal fistulotomy (2017), presenting today for evaluation of GI complaints. He reports chronic, sharp, lower abdominal pain for several months. He rates it an 8/10 at the worst and a 6-7/10 at best. He also reports chronic diarrhea for several months as well. It occurs several days a week. He usually has 4-8 loose stools a day during these episodes. He often has diarrhea after eating. It is not related to any particular food intake. He has had episodes of incontinences where he thought he was just passing gas but ended up passing liquid stool as well. He denies rectal bleeding. In 2017 he underwent a colonoscopy with Dr. Jiménez who is a colorectal surgeon. A 5 mm rectal polyp was found. Pathology revealed a hyperplastic polyp with adematous changes. That same year he also underwent an anal fistulotomy for an anorectal fistula. He has no family history of colon cancer, Crohn's disease, Ulcerative colitis, or Celiac disease that he is aware of.   He is also reporting a poor appetite and abnormal weight loss over the last 2-3 weeks. He has had a few instances of mild nausea but no vomiting. He states that he is lidia if he can get in one meal a day because his appetite is so poor. He has lost about 12 lbs. in the last week unintentionally. Denies heartburn, acid reflux, dysphagia, odynophagia. He has never had an EGD. No family history of gastric cancer.   No recent labs or imaging.

## 2025-06-19 NOTE — REASON FOR VISIT
[Initial Evaluation] : an initial evaluation [FreeTextEntry1] : diarrhea, lower abdominal pain, weight loss, loss of appetite

## 2025-06-19 NOTE — ADDENDUM
[FreeTextEntry1] : IStella NP, am scribing for and the presence of Dr. Jeovany Colunga  the following sections HISTORY OF PRESENT ILLNESSS, PAST MEDICAL/FAMILY/SOCIAL HISTORY; REVIEW OF SYSTEMS; VITAL SIGNS; PHYSICAL EXAM; DISPOSITION.

## 2025-06-19 NOTE — ASSESSMENT
[FreeTextEntry1] : 59 year old male with abdominal pain, diarrhea, weight loss, and loss of appetite for several months. Significant LLQ pain on physical exam. Denies history of diverticulitis. No recent labs or imaging.   Will schedule for EGD and colonoscopy at [facility] for further evaluation. I have discussed the indications, benefits, risks (including but not limited to reaction to the anesthesia, infection, bleeding, missed lesions, and perforation), and alternatives to EGD and colonoscopy with the patient. The patient understands all options and has agreed to have both procedures. He is medically optimized   Suflave prep  STAT CT Abd/Pelvis  CBC, CMP, CRP, ESR, fecal calprotectin, fecal elastase, GI PCR  Cardiac clearance to hold Plavix x 5-7 days Hold Jardiance x 4 days  Start Dicyclomine 20 mg QID PRN for pain  I evaluated this patient with my ACP and agree with above assessment and management plan for eventual EGD and colonoscopy for further evaluation of this anorexia and unexplained weight loss and severe lower abdominal pain and history of colon polyps with remote colonoscopy in 2017 and no previous upper endoscopies.  His procedures will need to be done at [facility] because he has an AICD and he will require preprocedure cardiac clearance.  He will be sent in the interim for a CT of the abdomen and pelvis for evaluation of his lower abdominal pain anorexia as well as CBC and CMP and CRP and ESR and fecal calprotectin and fecal elastase for further evaluation of his chronic diarrhea.  He will hold his Plavix 7 days prior to the above procedures and pending cardiac clearance and the results of the above lab work and stool studies he will be medically optimized for the proposed upper endoscopy and colonoscopy and appeared to understand all of the above instructions, information, and management plan.  I did not have a copy of his previous colonoscopy and polypectomy report from 2017 to review at the time of this office evaluation.  He was told that if his abdominal pain becomes extremely severe he should go directly to [facility] emergency room for immediate CT scan and blood work.

## 2025-06-19 NOTE — PROCEDURE
[de-identified] : I injected his left knee. I discussed at length with the patient the planned steroid and lidocaine injection. The risks, benefits, convalescence and alternatives were reviewed. The possible side effects discussed included but were not limited to: pain, swelling, heat, bleeding, and redness. Symptoms are generally mild but if they are extensive then contact the office. Giving pain relievers by mouth such as NSAIDs or Tylenol can generally treat the reactions to steroid and lidocaine. Rare cases of infection have been noted. Rash, hives and itching may occur post injection. If you have muscle pain or cramps, flushing and or swelling of the face, rapid heart beat, nausea, dizziness, fever, chills, headache, difficulty breathing, swelling in the arms or legs, or have a prickly feeling of your skin, contact a health care provider immediately. Following this discussion, the knee was prepped with Alcohol and under sterile condition the 80 mg Depo-Medrol and 6 cc Lidocaine injection was performed with a 20 gauge needle through a superolateral injection site. The needle was introduced into the joint, aspiration was performed to ensure intra-articular placement and the medication was injected. Upon withdrawal of the needle the site was cleaned with alcohol and a band aid applied. The patient tolerated the injection well and there were no adverse effects. Post injection instructions included no strenuous activity for 24 hours, cryotherapy and if there are any adverse effects to contact the office.

## 2025-06-19 NOTE — DISCUSSION/SUMMARY
[Medication Risks Reviewed] : Medication risks reviewed [Surgical risks reviewed] : Surgical risks reviewed [de-identified] : Patient is a 59-year-old male here today for evaluation of severe left knee osteoarthritis as well as right knee pain status post right total knee arthroplasty done by another provider.  We did a thorough discussion by conservative surgical treatment options.  As for his right knee he does have significant instability and we discussed surgical intervention including exchange of polyethylene as well as a revision total knee arthroplasty.  He does wish to proceed with a revision in the future however at this time his left knee is more symptomatic.  He has undergone extensive conservative treatment for his left knee over the past 6 months including at home directed exercise activity modification NSAID use injections last cortisone injection was today is failed to have relief of the pain in his knee.  Due to the fact that he has tried as well as conservative treatment he wishes to proceed with a left total knee arthroplasty and I do think that is indicated.  We discussed respondents alternatives including but limited to blood loss infection damage depression structures need for revision surgery risk of periprosthetic fracture.  Patient agreement wishes to proceed.  He does wish to proceed in approxi-3 months time as he is currently taking care of his wife who has breast cancer.  He did tolerate the injection in his left knee well today.  He will follow-up with his cardiologist.  Will obtain a CT scan for Reese planning.  I will see him back postoperatively.  We did also discussed that we will need an aspiration of his right knee prior to surgical intervention of his right knee.  The patient is an 59 year old male who has bone on bone tricompartmental arthritis of the left knee. Based upon the patients continued symptoms and failure to respond to conservative treatment I have recommended a left total knee replacement for the patient. A discussion was had with the patient regarding a left total knee replacement. A long discussion was had with the patient as what the total joint replacement would entail. A model was used to demonstrate the operation and to discuss bearing surfaces of the implants. The hospitalization and rehabilitation were discussed. The use of perioperative antibiotics and DVT prophylaxis were discussed. The risks, benefits and alternatives to surgical intervention were discussed at length with the patient. Specific risks discussed included: infection, wound breakdown, numbness and damage to nerves, tendon, muscle, arteries or other blood vessels. The possibility of recurrent pain, no improvement in pain and actual worsening of the pain were also mentioned in conversation with the patient. Medical complications related to the patient's general medical health including deep vein thrombosis, pulmonary embolus, heart attack, stroke, death and other complications from anesthesia were discussed as well. The patient was told that we will take steps to minimize these risks by using sterile technique, antibiotics and DVT prophylaxis when appropriate and following the patient postoperatively in the clinic setting to monitor progress. The benefits of surgery were discussed with the patient including the potential to improve the current clinical condition through operative intervention. Alternatives to surgical intervention include continued conservative management which may yield less than optimal results in this particular patient. All questions were answered to the satisfaction of the patient. We did discuss implant choices and fixation, with shared decision making with the patient.  We discussed that the knee replacement will be done with robotic assistance to enhance accuracy and dynamic joint balancing.

## 2025-06-19 NOTE — HISTORY OF PRESENT ILLNESS
[de-identified] : Patient is a 59-year-old male here today for evaluation of low his right knee pain is gone for any years.  He was previously under the care of another provider underwent a right total knee arthroplasty many years ago.  He states that his right knee has never been well.  He feels like it is unstable.  He has recurrent pain over the medial aspect of the knee.  Hurts to go up or down stairs or rising to seated position.  Participate in physical therapy and states it does not help.  Denies any recent falls or trauma.  As for his left knee he is experiencing significant pain and discomfort.  He states that he has had multiple rounds of injections have not had relief he also underwent a radiofrequency ablation 3 months ago.  He states he still has severe pain.  Is here today to discuss further treatment options

## 2025-06-19 NOTE — PHYSICAL EXAM
[Alert] : alert [Normal Voice/Communication] : normal voice/communication [Healthy Appearing] : healthy appearing [No Acute Distress] : no acute distress [Sclera] : the sclera and conjunctiva were normal [Hearing Threshold Finger Rub Not Davison] : hearing was normal [Normal Lips/Gums] : the lips and gums were normal [Oropharynx] : the oropharynx was normal [Normal Appearance] : the appearance of the neck was normal [No Neck Mass] : no neck mass was observed [No Respiratory Distress] : no respiratory distress [No Acc Muscle Use] : no accessory muscle use [Respiration, Rhythm And Depth] : normal respiratory rhythm and effort [Auscultation Breath Sounds / Voice Sounds] : lungs were clear to auscultation bilaterally [Heart Rate And Rhythm] : heart rate was normal and rhythm regular [Normal S1, S2] : normal S1 and S2 [Murmurs] : no murmurs [Bowel Sounds] : normal bowel sounds [No Masses] : no abdominal mass palpated [Abdomen Soft] : soft [] : no hepatosplenomegaly [LLQ] : in the left lower quadrant [Oriented To Time, Place, And Person] : oriented to person, place, and time

## 2025-07-10 NOTE — HEALTH RISK ASSESSMENT
[1 or 2 (0 pts)] : 1 or 2 (0 points) [Never (0 pts)] : Never (0 points) [No] : In the past 12 months have you used drugs other than those required for medical reasons? No [No falls in past year] : Patient reported no falls in the past year [0] : 2) Feeling down, depressed, or hopeless: Not at all (0) [PHQ-2 Negative - No further assessment needed] : PHQ-2 Negative - No further assessment needed [Patient/Caregiver not ready to engage] : , patient/caregiver not ready to engage [I will adhere to the patient's wishes.] : I will adhere to the patient's wishes. [Time Spent: ___ minutes] : Time Spent: [unfilled] minutes [Never] : Never [Audit-CScore] : 0 [de-identified] : Average [de-identified] : Average [[id number]] : 0 [AdvancecareDate] : 03/24

## 2025-07-10 NOTE — PHYSICAL EXAM
[No Acute Distress] : no acute distress [Well Nourished] : well nourished [Well Developed] : well developed [Well-Appearing] : well-appearing [Normal Sclera/Conjunctiva] : normal sclera/conjunctiva [PERRL] : pupils equal round and reactive to light [EOMI] : extraocular movements intact [Normal Outer Ear/Nose] : the outer ears and nose were normal in appearance [Normal Oropharynx] : the oropharynx was normal [No JVD] : no jugular venous distention [No Lymphadenopathy] : no lymphadenopathy [Supple] : supple [Thyroid Normal, No Nodules] : the thyroid was normal and there were no nodules present [No Respiratory Distress] : no respiratory distress  [No Accessory Muscle Use] : no accessory muscle use [Clear to Auscultation] : lungs were clear to auscultation bilaterally [Normal Rate] : normal rate  [Regular Rhythm] : with a regular rhythm [Normal S1, S2] : normal S1 and S2 [No Murmur] : no murmur heard [No Carotid Bruits] : no carotid bruits [No Abdominal Bruit] : a ~M bruit was not heard ~T in the abdomen [No Varicosities] : no varicosities [Pedal Pulses Present] : the pedal pulses are present [No Edema] : there was no peripheral edema [No Palpable Aorta] : no palpable aorta [No Extremity Clubbing/Cyanosis] : no extremity clubbing/cyanosis [Soft] : abdomen soft [Non Tender] : non-tender [Non-distended] : non-distended [No Masses] : no abdominal mass palpated [No HSM] : no HSM [Normal Bowel Sounds] : normal bowel sounds [Normal Posterior Cervical Nodes] : no posterior cervical lymphadenopathy [Normal Anterior Cervical Nodes] : no anterior cervical lymphadenopathy [No CVA Tenderness] : no CVA  tenderness [No Spinal Tenderness] : no spinal tenderness [No Joint Swelling] : no joint swelling [Grossly Normal Strength/Tone] : grossly normal strength/tone [No Rash] : no rash [Coordination Grossly Intact] : coordination grossly intact [No Focal Deficits] : no focal deficits [Normal Gait] : normal gait [Deep Tendon Reflexes (DTR)] : deep tendon reflexes were 2+ and symmetric [Normal Affect] : the affect was normal [Normal Mood] : the mood was normal [Normal Insight/Judgement] : insight and judgment were intact [Normal] : Normal [Comprehensive Foot Exam Normal] : Right and left foot were examined and both feet are normal. No ulcers in either foot. Toes are normal and with full ROM.  Normal tactile sensation with monofilament testing throughout both feet [de-identified] : pacemaker  [de-identified] : Tinea corporis  [TextBox_2] : yes [TextBox_4] : HS

## 2025-07-10 NOTE — HEALTH RISK ASSESSMENT
[1 or 2 (0 pts)] : 1 or 2 (0 points) [Never (0 pts)] : Never (0 points) [No] : In the past 12 months have you used drugs other than those required for medical reasons? No [No falls in past year] : Patient reported no falls in the past year [0] : 2) Feeling down, depressed, or hopeless: Not at all (0) [PHQ-2 Negative - No further assessment needed] : PHQ-2 Negative - No further assessment needed [Patient/Caregiver not ready to engage] : , patient/caregiver not ready to engage [I will adhere to the patient's wishes.] : I will adhere to the patient's wishes. [Time Spent: ___ minutes] : Time Spent: [unfilled] minutes [Never] : Never [Audit-CScore] : 0 [de-identified] : Average [de-identified] : Average [[id number]] : 0 [AdvancecareDate] : 03/24

## 2025-07-10 NOTE — HISTORY OF PRESENT ILLNESS
[FreeTextEntry1] : F/U for s/p Ecoli infection and skin eruption  [de-identified] : F/U for s/p Ecoli infection and skin eruption

## 2025-07-10 NOTE — HISTORY OF PRESENT ILLNESS
[FreeTextEntry1] : F/U for s/p Ecoli infection and skin eruption  [de-identified] : F/U for s/p Ecoli infection and skin eruption

## 2025-07-10 NOTE — ASSESSMENT
[FreeTextEntry1] : HTN/Overweight/Fatigue/CAD - Diet Exercise, Controlled with Meds.  PSA Screening - PSA. 2.19 Anxiety - managed with medication ED - Controlled with meds  COVID  19 Screening - + COVID 19 Abs.  Headaches - Neurology Mgt R Hand Pain - Hand Surgical Mgt. Knee Pain - Ortho Mgt. Vit D deficiency - Vit D  CKD/decreased GFR/Microalbuminuria - CONSIDER D/C SPIRINOLACTONE, ADD KERINDIA after labs.  BL Knee Pain - Ortho Referral. Tinea corporis -  Nystatin and Clotrimazole-betamethasone   F/U in fall to Repeat Test Results.

## 2025-07-10 NOTE — PHYSICAL EXAM
[No Acute Distress] : no acute distress [Well Nourished] : well nourished [Well Developed] : well developed [Well-Appearing] : well-appearing [Normal Sclera/Conjunctiva] : normal sclera/conjunctiva [PERRL] : pupils equal round and reactive to light [EOMI] : extraocular movements intact [Normal Outer Ear/Nose] : the outer ears and nose were normal in appearance [Normal Oropharynx] : the oropharynx was normal [No JVD] : no jugular venous distention [No Lymphadenopathy] : no lymphadenopathy [Supple] : supple [Thyroid Normal, No Nodules] : the thyroid was normal and there were no nodules present [No Respiratory Distress] : no respiratory distress  [No Accessory Muscle Use] : no accessory muscle use [Clear to Auscultation] : lungs were clear to auscultation bilaterally [Normal Rate] : normal rate  [Regular Rhythm] : with a regular rhythm [Normal S1, S2] : normal S1 and S2 [No Murmur] : no murmur heard [No Carotid Bruits] : no carotid bruits [No Abdominal Bruit] : a ~M bruit was not heard ~T in the abdomen [No Varicosities] : no varicosities [Pedal Pulses Present] : the pedal pulses are present [No Edema] : there was no peripheral edema [No Palpable Aorta] : no palpable aorta [No Extremity Clubbing/Cyanosis] : no extremity clubbing/cyanosis [Soft] : abdomen soft [Non Tender] : non-tender [Non-distended] : non-distended [No Masses] : no abdominal mass palpated [No HSM] : no HSM [Normal Bowel Sounds] : normal bowel sounds [Normal Posterior Cervical Nodes] : no posterior cervical lymphadenopathy [Normal Anterior Cervical Nodes] : no anterior cervical lymphadenopathy [No CVA Tenderness] : no CVA  tenderness [No Spinal Tenderness] : no spinal tenderness [No Joint Swelling] : no joint swelling [Grossly Normal Strength/Tone] : grossly normal strength/tone [No Rash] : no rash [Coordination Grossly Intact] : coordination grossly intact [No Focal Deficits] : no focal deficits [Normal Gait] : normal gait [Deep Tendon Reflexes (DTR)] : deep tendon reflexes were 2+ and symmetric [Normal Affect] : the affect was normal [Normal Mood] : the mood was normal [Normal Insight/Judgement] : insight and judgment were intact [Normal] : Normal [Comprehensive Foot Exam Normal] : Right and left foot were examined and both feet are normal. No ulcers in either foot. Toes are normal and with full ROM.  Normal tactile sensation with monofilament testing throughout both feet [de-identified] : pacemaker  [de-identified] : Tinea corporis  [TextBox_2] : yes [TextBox_4] : HS